# Patient Record
Sex: MALE | Race: NATIVE HAWAIIAN OR OTHER PACIFIC ISLANDER | NOT HISPANIC OR LATINO | Employment: FULL TIME | URBAN - METROPOLITAN AREA
[De-identification: names, ages, dates, MRNs, and addresses within clinical notes are randomized per-mention and may not be internally consistent; named-entity substitution may affect disease eponyms.]

---

## 2020-10-02 ENCOUNTER — HOSPITAL ENCOUNTER (EMERGENCY)
Facility: MEDICAL CENTER | Age: 40
End: 2020-10-02
Attending: EMERGENCY MEDICINE
Payer: COMMERCIAL

## 2020-10-02 VITALS
WEIGHT: 236.55 LBS | HEIGHT: 69 IN | HEART RATE: 80 BPM | OXYGEN SATURATION: 93 % | TEMPERATURE: 97.9 F | DIASTOLIC BLOOD PRESSURE: 78 MMHG | BODY MASS INDEX: 35.04 KG/M2 | RESPIRATION RATE: 20 BRPM | SYSTOLIC BLOOD PRESSURE: 135 MMHG

## 2020-10-02 DIAGNOSIS — R42 LIGHTHEADEDNESS: ICD-10-CM

## 2020-10-02 LAB
ALBUMIN SERPL BCP-MCNC: 4.3 G/DL (ref 3.2–4.9)
ALBUMIN/GLOB SERPL: 1.2 G/DL
ALP SERPL-CCNC: 77 U/L (ref 30–99)
ALT SERPL-CCNC: 45 U/L (ref 2–50)
AMPHET UR QL SCN: NEGATIVE
ANION GAP SERPL CALC-SCNC: 12 MMOL/L (ref 7–16)
AST SERPL-CCNC: 35 U/L (ref 12–45)
BARBITURATES UR QL SCN: NEGATIVE
BASOPHILS # BLD AUTO: 0 % (ref 0–1.8)
BASOPHILS # BLD: 0 K/UL (ref 0–0.12)
BENZODIAZ UR QL SCN: NEGATIVE
BILIRUB SERPL-MCNC: 0.5 MG/DL (ref 0.1–1.5)
BUN SERPL-MCNC: 12 MG/DL (ref 8–22)
BZE UR QL SCN: NEGATIVE
CALCIUM SERPL-MCNC: 9 MG/DL (ref 8.5–10.5)
CANNABINOIDS UR QL SCN: NEGATIVE
CHLORIDE SERPL-SCNC: 99 MMOL/L (ref 96–112)
CO2 SERPL-SCNC: 24 MMOL/L (ref 20–33)
CREAT SERPL-MCNC: 1.06 MG/DL (ref 0.5–1.4)
EKG IMPRESSION: NORMAL
EOSINOPHIL # BLD AUTO: 0.05 K/UL (ref 0–0.51)
EOSINOPHIL NFR BLD: 1.3 % (ref 0–6.9)
ERYTHROCYTE [DISTWIDTH] IN BLOOD BY AUTOMATED COUNT: 38.5 FL (ref 35.9–50)
GLOBULIN SER CALC-MCNC: 3.6 G/DL (ref 1.9–3.5)
GLUCOSE SERPL-MCNC: 98 MG/DL (ref 65–99)
HCT VFR BLD AUTO: 52.3 % (ref 42–52)
HGB BLD-MCNC: 18 G/DL (ref 14–18)
IMM GRANULOCYTES # BLD AUTO: 0.02 K/UL (ref 0–0.11)
IMM GRANULOCYTES NFR BLD AUTO: 0.5 % (ref 0–0.9)
LYMPHOCYTES # BLD AUTO: 0.96 K/UL (ref 1–4.8)
LYMPHOCYTES NFR BLD: 24.2 % (ref 22–41)
MAGNESIUM SERPL-MCNC: 2.1 MG/DL (ref 1.5–2.5)
MCH RBC QN AUTO: 29.4 PG (ref 27–33)
MCHC RBC AUTO-ENTMCNC: 34.4 G/DL (ref 33.7–35.3)
MCV RBC AUTO: 85.3 FL (ref 81.4–97.8)
METHADONE UR QL SCN: NEGATIVE
MONOCYTES # BLD AUTO: 0.34 K/UL (ref 0–0.85)
MONOCYTES NFR BLD AUTO: 8.6 % (ref 0–13.4)
NEUTROPHILS # BLD AUTO: 2.59 K/UL (ref 1.82–7.42)
NEUTROPHILS NFR BLD: 65.4 % (ref 44–72)
NRBC # BLD AUTO: 0 K/UL
NRBC BLD-RTO: 0 /100 WBC
OPIATES UR QL SCN: NEGATIVE
OXYCODONE UR QL SCN: NEGATIVE
PCP UR QL SCN: NEGATIVE
PHOSPHATE SERPL-MCNC: 3.1 MG/DL (ref 2.5–4.5)
PLATELET # BLD AUTO: 150 K/UL (ref 164–446)
PMV BLD AUTO: 9.6 FL (ref 9–12.9)
POTASSIUM SERPL-SCNC: 3.8 MMOL/L (ref 3.6–5.5)
PROPOXYPH UR QL SCN: NEGATIVE
PROT SERPL-MCNC: 7.9 G/DL (ref 6–8.2)
RBC # BLD AUTO: 6.13 M/UL (ref 4.7–6.1)
SODIUM SERPL-SCNC: 135 MMOL/L (ref 135–145)
WBC # BLD AUTO: 4 K/UL (ref 4.8–10.8)

## 2020-10-02 PROCEDURE — 84100 ASSAY OF PHOSPHORUS: CPT

## 2020-10-02 PROCEDURE — 36415 COLL VENOUS BLD VENIPUNCTURE: CPT

## 2020-10-02 PROCEDURE — 700105 HCHG RX REV CODE 258: Performed by: EMERGENCY MEDICINE

## 2020-10-02 PROCEDURE — 80307 DRUG TEST PRSMV CHEM ANLYZR: CPT

## 2020-10-02 PROCEDURE — 93005 ELECTROCARDIOGRAM TRACING: CPT | Performed by: EMERGENCY MEDICINE

## 2020-10-02 PROCEDURE — 85025 COMPLETE CBC W/AUTO DIFF WBC: CPT

## 2020-10-02 PROCEDURE — 80053 COMPREHEN METABOLIC PANEL: CPT

## 2020-10-02 PROCEDURE — 99284 EMERGENCY DEPT VISIT MOD MDM: CPT

## 2020-10-02 PROCEDURE — 83735 ASSAY OF MAGNESIUM: CPT

## 2020-10-02 RX ORDER — ACETAMINOPHEN 325 MG/1
650 TABLET ORAL EVERY 4 HOURS PRN
COMMUNITY

## 2020-10-02 RX ORDER — SODIUM CHLORIDE 9 MG/ML
1000 INJECTION, SOLUTION INTRAVENOUS ONCE
Status: COMPLETED | OUTPATIENT
Start: 2020-10-02 | End: 2020-10-02

## 2020-10-02 RX ADMIN — SODIUM CHLORIDE 1000 ML: 9 INJECTION, SOLUTION INTRAVENOUS at 12:19

## 2020-10-02 NOTE — ED TRIAGE NOTES
".  Chief Complaint   Patient presents with   • Lightheadedness     since Tuesday     .BP (!) 161/81   Pulse 85   Temp 36.6 °C (97.9 °F) (Temporal)   Resp 16   Ht 1.753 m (5' 9\")   Wt 107.3 kg (236 lb 8.9 oz)   SpO2 96%   BMI 34.93 kg/m²     Ambulatory to triage with above complaints, educated on triage process, placed in lobby, told to inform staff of any changes in condition.    "

## 2020-10-02 NOTE — ED PROVIDER NOTES
"ED Provider Note    CHIEF COMPLAINT  Chief Complaint   Patient presents with   • Lightheadedness     since Tuesday       HPI  Deion Walter is a 39 y.o. male who presents complaining of lightheadedness.    Patient states he has felt lightheaded for the last 3 days.  He states \"my body feels light.\"  He also feels like he may have a fever because his \"head feels warm.\"  Patient is concerned he may have diabetes like the rest of his family.  He also states he drinks \"a lot.\"  He reports binge drinking, mainly while watching football on the weekends, up to a 24 pack at a time.    Patient denies headache, blurred vision, double vision, chest pain, shortness of breath, cough, trauma, history of similar symptoms, nausea, vomiting, diarrhea, appetite changes, vertigo, or syncope.  His only medication that he has been taking is Tylenol for his \"fever.\"      ALLERGIES  No Known Allergies    CURRENT MEDICATIONS  Home Medications     Reviewed by Yesenia Becker R.N. (Registered Nurse) on 10/02/20 at 0940  Med List Status: Complete   Medication Last Dose Status   acetaminophen (TYLENOL) 325 MG Tab PRN Active                PAST MEDICAL HISTORY     Denies    SURGICAL HISTORY  patient denies any surgical history    SOCIAL HISTORY  Social History     Tobacco Use   • Smoking status: Never Smoker   Substance and Sexual Activity   • Alcohol use: Yes   • Drug use: Never   • Sexual activity: Not on file       Family Hx:  Diabetes  Hypertension      REVIEW OF SYSTEMS  See HPI for further details.  All other systems are negative except as above in HPI.    PHYSICAL EXAM  VITAL SIGNS: /87   Pulse (!) 101   Temp 36.6 °C (97.9 °F) (Temporal)   Resp 20   Ht 1.753 m (5' 9\")   Wt 107.3 kg (236 lb 8.9 oz)   SpO2 96%   BMI 34.93 kg/m²     General:  WD overweight, nontoxic appearing in NAD; A+Ox3; V/S as above; tachycardic at triage but not on my exam  Skin: warm and dry; good color; no rash  HEENT: NCAT; EOMs intact; PERRL; " no scleral icterus   Neck: FROM; no meningismus  Cardiovascular: Regular heart rate and rhythm.  No murmurs, rubs, or gallops; pulses 2+ bilaterally radially and DP areas  Lungs: Clear to auscultation with good air movement bilaterally.  No wheezes, rhonchi, or rales.   Abdomen: BS present; soft; NTND; no rebound, guarding, or rigidity.  No organomegaly or pulsatile mass  Extremities: ADLER x 4; no e/o trauma; no pedal edema; neg Semaj's  Neurologic: CNs III-XII grossly intact; speech clear; distal sensation intact; strength 5/5 UE/LEs  Psychiatric: Appropriate affect, normal mood    LABS  Results for orders placed or performed during the hospital encounter of 10/02/20   CBC WITH DIFFERENTIAL   Result Value Ref Range    WBC 4.0 (L) 4.8 - 10.8 K/uL    RBC 6.13 (H) 4.70 - 6.10 M/uL    Hemoglobin 18.0 14.0 - 18.0 g/dL    Hematocrit 52.3 (H) 42.0 - 52.0 %    MCV 85.3 81.4 - 97.8 fL    MCH 29.4 27.0 - 33.0 pg    MCHC 34.4 33.7 - 35.3 g/dL    RDW 38.5 35.9 - 50.0 fL    Platelet Count 150 (L) 164 - 446 K/uL    MPV 9.6 9.0 - 12.9 fL    Neutrophils-Polys 65.40 44.00 - 72.00 %    Lymphocytes 24.20 22.00 - 41.00 %    Monocytes 8.60 0.00 - 13.40 %    Eosinophils 1.30 0.00 - 6.90 %    Basophils 0.00 0.00 - 1.80 %    Immature Granulocytes 0.50 0.00 - 0.90 %    Nucleated RBC 0.00 /100 WBC    Neutrophils (Absolute) 2.59 1.82 - 7.42 K/uL    Lymphs (Absolute) 0.96 (L) 1.00 - 4.80 K/uL    Monos (Absolute) 0.34 0.00 - 0.85 K/uL    Eos (Absolute) 0.05 0.00 - 0.51 K/uL    Baso (Absolute) 0.00 0.00 - 0.12 K/uL    Immature Granulocytes (abs) 0.02 0.00 - 0.11 K/uL    NRBC (Absolute) 0.00 K/uL   CMP   Result Value Ref Range    Sodium 135 135 - 145 mmol/L    Potassium 3.8 3.6 - 5.5 mmol/L    Chloride 99 96 - 112 mmol/L    Co2 24 20 - 33 mmol/L    Anion Gap 12.0 7.0 - 16.0    Glucose 98 65 - 99 mg/dL    Bun 12 8 - 22 mg/dL    Creatinine 1.06 0.50 - 1.40 mg/dL    Calcium 9.0 8.5 - 10.5 mg/dL    AST(SGOT) 35 12 - 45 U/L    ALT(SGPT) 45 2 - 50 U/L     Alkaline Phosphatase 77 30 - 99 U/L    Total Bilirubin 0.5 0.1 - 1.5 mg/dL    Albumin 4.3 3.2 - 4.9 g/dL    Total Protein 7.9 6.0 - 8.2 g/dL    Globulin 3.6 (H) 1.9 - 3.5 g/dL    A-G Ratio 1.2 g/dL   MAGNESIUM   Result Value Ref Range    Magnesium 2.1 1.5 - 2.5 mg/dL   PHOSPHORUS   Result Value Ref Range    Phosphorus 3.1 2.5 - 4.5 mg/dL   ESTIMATED GFR   Result Value Ref Range    GFR If African American >60 >60 mL/min/1.73 m 2    GFR If Non African American >60 >60 mL/min/1.73 m 2   EKG   Result Value Ref Range    Report       [facility] Emergency [facility]    Test Date:  2020-10-02  Pt Name:    DEION LEBRON          Department: ER  MRN:        [id number]                      Room:       [facility]  Gender:     Male                         Technician: [id number]  :        1980                   Requested By:JEAN PAUL ROCHA  Order #:    [id number]                    Reading MD: JEAN PAUL ROCHA MD    Measurements  Intervals                                Axis  Rate:       85                           P:          16  ME:         152                          QRS:        43  QRSD:       90                           T:          2  QT:         352  QTc:        419    Interpretive Statements  SINUS RHYTHM  BORDERLINE LOW VOLTAGE IN FRONTAL LEADS  No previous ECG available for comparison  Electronically Signed On 10-2-2020 11:07:38 PDT by JEAN PAUL ROCHA MD           MEDICAL RECORD  I have reviewed patient's medical record and pertinent results are listed below.      COURSE & MEDICAL DECISION MAKING  I have reviewed any medical record information, laboratory studies and radiographic results as noted.    Deion Lebron is a 39 y.o. male who presents complaining of lightheadedness.  Not vertiginous.  Patient denies any symptoms that would be concerning for a cardiac arrhythmia such as palpitations, chest pain, shortness of breath.  Patient does not appear anemic.  He admits to drinking heavily  "prior to the onset of symptoms.  Patient appears to be a binge drinker.  We will check labs including CBC and electrolytes.  Orthostatics revealed that the patient became tachycardic with standing.    Appropriate PPE was worn at all times while interacting with the patient, including goggles, N95 mask, and surgical mask.    NS bolus was ordered for possible dehydration related to patient's tachycardia with standing.    Pt's HR normalized after fluid bolus       Pt was re-evaluated at 1:46 PM  Patient feels improved upon reevaluation.  We will ambulate.  He was advised of his lab results.  He is amenable to being discharged.  He believes his symptoms were due to \"drinking too much beer.\"  I suspect this is likely but advised him to return to the ER for chest pain, shortness of breath, fainting, continued lightheadedness, or other concerns.  I also advised him to establish care with a primary care provider.      FINAL IMPRESSION  1. Lightheadedness         Electronically signed by: Angela Macias M.D., 10/2/2020 10:20 AM          "

## 2020-10-02 NOTE — DISCHARGE INSTRUCTIONS
Avoid excessive use of alcohol    Drink plenty of fluids such as water and Gatorade    Return to the ER for chest pain, shortness of breath, worsening symptoms, or other concerns    Establish care with a primary care doctor who can follow-up on how you are feeling and address any other concerns